# Patient Record
(demographics unavailable — no encounter records)

---

## 2017-01-31 NOTE — TELEPHONE ENCOUNTER
Patient has moved to [place] and would like [email or web address] know if they can have a refill on the medication while searching for a PCP in area.

## 2017-02-01 RX ORDER — INSULIN DEGLUDEC 100 U/ML
15 INJECTION, SOLUTION SUBCUTANEOUS DAILY
Qty: 3 PEN | Refills: 1 | Status: SHIPPED | OUTPATIENT
Start: 2017-02-01